# Patient Record
Sex: FEMALE
[De-identification: names, ages, dates, MRNs, and addresses within clinical notes are randomized per-mention and may not be internally consistent; named-entity substitution may affect disease eponyms.]

---

## 2023-05-19 ENCOUNTER — NURSE TRIAGE (OUTPATIENT)
Dept: OTHER | Facility: CLINIC | Age: 68
End: 2023-05-19

## 2023-05-19 NOTE — TELEPHONE ENCOUNTER
Location of patient: [place]    Subjective: Caller states \"I've been exercising and sat to rest.  I got up and looked in the mirror and my eye is very red in the corner. \"     Onset:   Friday, 5/19/23    Pain Severity:   no pain    What has been tried: nothing so far    Recommended disposition: See PCP within 3 Days    Care advice provided, patient verbalizes understanding; denies any other questions or concerns; instructed to call back for any new or worsening symptoms. Pt states understanding of instructions. This triage is a result of a call to [address] [place] [address]. Please do not respond to the triage nurse through this encounter. Any subsequent communication should be directly with the patient.     Reason for Disposition   Bleeding on white of the eye    Protocols used: Eye - Red Without Pus-ADULT-[facility]

## 2023-07-25 ENCOUNTER — NURSE TRIAGE (OUTPATIENT)
Dept: OTHER | Facility: CLINIC | Age: 68
End: 2023-07-25

## 2023-07-25 NOTE — TELEPHONE ENCOUNTER
Location of patient: [place]    Subjective: Caller states \"I am worried that the stinger is still in there\"     Current Symptoms: bee sting under left buttuck, pt sees black dot inside sting but does not feel anything on the skin    Onset: a few hours ago; unchanged    Associated Symptoms: NA    Temperature: NA     What has been tried: baking soda paste    LMP: NA Pregnant: NA    Recommended disposition: Home Care    Care advice provided, patient verbalizes understanding; denies any other questions or concerns; instructed to call back for any new or worsening symptoms. Home care    This triage is a result of a call to [address] [place] [address]. Please do not respond to the triage nurse through this encounter. Any subsequent communication should be directly with the patient.     Reason for Disposition   Normal local reaction to bee, wasp, or yellow jacket sting    Protocols used: Bee or Yellow Jacket Sting-ADULT-

## 2023-07-25 NOTE — TELEPHONE ENCOUNTER
Location of patient: [place]    Subjective: Caller states \"I was just stung by a bee\"     Current Symptoms: Caller reports being stung by a bee under her L buttocks    Onset: 5 minutes    Associated Symptoms: Bee sting    Pain Severity: Uncomfortable    Temperature: NA    What has been tried: Checking for stinger    LMP: NA Pregnant: NA    Recommended disposition: [facility] advice provided, patient verbalizes understanding; denies any other questions or concerns; instructed to call back for any new or worsening symptoms. This triage is a result of a call to [address] [place] [address]. Please do not respond to the triage nurse through this encounter. Any subsequent communication should be directly with the patient.       Reason for Disposition   Normal local reaction to bee, wasp, or yellow jacket sting    Protocols used: Bee or Yellow Jacket Sting-ADULT-[place]